# Patient Record
Sex: FEMALE | URBAN - METROPOLITAN AREA
[De-identification: names, ages, dates, MRNs, and addresses within clinical notes are randomized per-mention and may not be internally consistent; named-entity substitution may affect disease eponyms.]

---

## 2017-09-23 ENCOUNTER — EMERGENCY (EMERGENCY)
Facility: HOSPITAL | Age: 47
LOS: 1 days | Discharge: HOME | End: 2017-09-23
Attending: EMERGENCY MEDICINE
Payer: COMMERCIAL

## 2017-09-23 VITALS
DIASTOLIC BLOOD PRESSURE: 85 MMHG | HEART RATE: 68 BPM | SYSTOLIC BLOOD PRESSURE: 148 MMHG | WEIGHT: 160.06 LBS | HEIGHT: 62 IN | RESPIRATION RATE: 16 BRPM | OXYGEN SATURATION: 98 % | TEMPERATURE: 98 F

## 2017-09-23 DIAGNOSIS — R51 HEADACHE: ICD-10-CM

## 2017-09-23 DIAGNOSIS — R20.0 ANESTHESIA OF SKIN: ICD-10-CM

## 2017-09-23 DIAGNOSIS — R53.1 WEAKNESS: ICD-10-CM

## 2017-09-23 DIAGNOSIS — M54.2 CERVICALGIA: ICD-10-CM

## 2017-09-23 LAB
ALBUMIN SERPL ELPH-MCNC: 3.6 G/DL — SIGNIFICANT CHANGE UP (ref 3.5–5)
ALP SERPL-CCNC: 81 U/L — SIGNIFICANT CHANGE UP (ref 40–120)
ALT FLD-CCNC: 27 U/L DA — SIGNIFICANT CHANGE UP (ref 10–60)
ANION GAP SERPL CALC-SCNC: 7 MMOL/L — SIGNIFICANT CHANGE UP (ref 5–17)
APPEARANCE UR: CLEAR — SIGNIFICANT CHANGE UP
AST SERPL-CCNC: 19 U/L — SIGNIFICANT CHANGE UP (ref 10–40)
BACTERIA # UR AUTO: ABNORMAL /HPF
BASOPHILS # BLD AUTO: 0.1 K/UL — SIGNIFICANT CHANGE UP (ref 0–0.2)
BASOPHILS NFR BLD AUTO: 1.4 % — SIGNIFICANT CHANGE UP (ref 0–2)
BILIRUB SERPL-MCNC: 0.3 MG/DL — SIGNIFICANT CHANGE UP (ref 0.2–1.2)
BILIRUB UR-MCNC: NEGATIVE — SIGNIFICANT CHANGE UP
BUN SERPL-MCNC: 17 MG/DL — SIGNIFICANT CHANGE UP (ref 7–18)
CALCIUM SERPL-MCNC: 8.9 MG/DL — SIGNIFICANT CHANGE UP (ref 8.4–10.5)
CHLORIDE SERPL-SCNC: 111 MMOL/L — HIGH (ref 96–108)
CO2 SERPL-SCNC: 25 MMOL/L — SIGNIFICANT CHANGE UP (ref 22–31)
COLOR SPEC: YELLOW — SIGNIFICANT CHANGE UP
CREAT SERPL-MCNC: 0.77 MG/DL — SIGNIFICANT CHANGE UP (ref 0.5–1.3)
DIFF PNL FLD: NEGATIVE — SIGNIFICANT CHANGE UP
EOSINOPHIL # BLD AUTO: 0.4 K/UL — SIGNIFICANT CHANGE UP (ref 0–0.5)
EOSINOPHIL NFR BLD AUTO: 5.3 % — SIGNIFICANT CHANGE UP (ref 0–6)
EPI CELLS # UR: SIGNIFICANT CHANGE UP
GLUCOSE SERPL-MCNC: 99 MG/DL — SIGNIFICANT CHANGE UP (ref 70–99)
GLUCOSE UR QL: NEGATIVE — SIGNIFICANT CHANGE UP
HCT VFR BLD CALC: 32.5 % — LOW (ref 34.5–45)
HGB BLD-MCNC: 10.2 G/DL — LOW (ref 11.5–15.5)
KETONES UR-MCNC: NEGATIVE — SIGNIFICANT CHANGE UP
LEUKOCYTE ESTERASE UR-ACNC: ABNORMAL
LYMPHOCYTES # BLD AUTO: 2.2 K/UL — SIGNIFICANT CHANGE UP (ref 1–3.3)
LYMPHOCYTES # BLD AUTO: 30.6 % — SIGNIFICANT CHANGE UP (ref 13–44)
MCHC RBC-ENTMCNC: 24.8 PG — LOW (ref 27–34)
MCHC RBC-ENTMCNC: 31.3 GM/DL — LOW (ref 32–36)
MCV RBC AUTO: 79.2 FL — LOW (ref 80–100)
MONOCYTES # BLD AUTO: 0.6 K/UL — SIGNIFICANT CHANGE UP (ref 0–0.9)
MONOCYTES NFR BLD AUTO: 8.6 % — SIGNIFICANT CHANGE UP (ref 2–14)
NEUTROPHILS # BLD AUTO: 3.8 K/UL — SIGNIFICANT CHANGE UP (ref 1.8–7.4)
NEUTROPHILS NFR BLD AUTO: 54 % — SIGNIFICANT CHANGE UP (ref 43–77)
NITRITE UR-MCNC: NEGATIVE — SIGNIFICANT CHANGE UP
PH UR: 6 — SIGNIFICANT CHANGE UP (ref 5–8)
PLATELET # BLD AUTO: 325 K/UL — SIGNIFICANT CHANGE UP (ref 150–400)
POTASSIUM SERPL-MCNC: 4.1 MMOL/L — SIGNIFICANT CHANGE UP (ref 3.5–5.3)
POTASSIUM SERPL-SCNC: 4.1 MMOL/L — SIGNIFICANT CHANGE UP (ref 3.5–5.3)
PROT SERPL-MCNC: 7.3 G/DL — SIGNIFICANT CHANGE UP (ref 6–8.3)
PROT UR-MCNC: NEGATIVE — SIGNIFICANT CHANGE UP
RBC # BLD: 4.1 M/UL — SIGNIFICANT CHANGE UP (ref 3.8–5.2)
RBC # FLD: 15.9 % — HIGH (ref 10.3–14.5)
RBC CASTS # UR COMP ASSIST: ABNORMAL /HPF (ref 0–2)
SODIUM SERPL-SCNC: 143 MMOL/L — SIGNIFICANT CHANGE UP (ref 135–145)
SP GR SPEC: 1.01 — SIGNIFICANT CHANGE UP (ref 1.01–1.02)
UROBILINOGEN FLD QL: NEGATIVE — SIGNIFICANT CHANGE UP
WBC # BLD: 7.1 K/UL — SIGNIFICANT CHANGE UP (ref 3.8–10.5)
WBC # FLD AUTO: 7.1 K/UL — SIGNIFICANT CHANGE UP (ref 3.8–10.5)
WBC UR QL: SIGNIFICANT CHANGE UP /HPF (ref 0–5)

## 2017-09-23 PROCEDURE — 70450 CT HEAD/BRAIN W/O DYE: CPT

## 2017-09-23 PROCEDURE — 85027 COMPLETE CBC AUTOMATED: CPT

## 2017-09-23 PROCEDURE — 81001 URINALYSIS AUTO W/SCOPE: CPT

## 2017-09-23 PROCEDURE — 99284 EMERGENCY DEPT VISIT MOD MDM: CPT

## 2017-09-23 PROCEDURE — 80053 COMPREHEN METABOLIC PANEL: CPT

## 2017-09-23 PROCEDURE — 99284 EMERGENCY DEPT VISIT MOD MDM: CPT | Mod: 25

## 2017-09-23 PROCEDURE — 70450 CT HEAD/BRAIN W/O DYE: CPT | Mod: 26

## 2017-09-23 RX ORDER — IBUPROFEN 200 MG
600 TABLET ORAL ONCE
Refills: 0 | Status: COMPLETED | OUTPATIENT
Start: 2017-09-23 | End: 2017-09-23

## 2017-09-23 RX ORDER — DIAZEPAM 5 MG
5 TABLET ORAL ONCE
Refills: 0 | Status: DISCONTINUED | OUTPATIENT
Start: 2017-09-23 | End: 2017-09-23

## 2017-09-23 RX ADMIN — Medication 1 MILLIGRAM(S): at 23:07

## 2017-09-23 RX ADMIN — Medication 600 MILLIGRAM(S): at 23:07

## 2017-09-23 NOTE — ED PROVIDER NOTE - NEUROLOGICAL, MLM
Alert and oriented, no focal deficits, no motor or sensory deficits. Alert and oriented, no focal deficits, no motor or sensory deficits. Normal gait, no droop, cranial nerves 2-12 intact.

## 2017-09-23 NOTE — ED PROVIDER NOTE - CONSTITUTIONAL, MLM
normal... Well appearing, well nourished, awake, alert, oriented to person, place, time/situation and in no apparent distress. Pt is tearful and upset, otwerwise awake, alert, oriented to person, place, time/situation and in no apparent distress. Pt is tearful and upset, otherwise awake, alert, oriented to person, place, time/situation and in no apparent distress.

## 2017-09-23 NOTE — ED PROVIDER NOTE - HISTORY ATTESTATION, MLM
BRIEF OPERATIVE NOTE    Date of Procedure: 5/18/2017   Preoperative Diagnosis: OSTEOARTHRITIS LEFT KNEE  Postoperative Diagnosis: OSTEOARTHRITIS LEFT KNEE    Procedure(s):  LEFT TOTAL KNEE ARTHROPLASTY  Surgeon(s) and Role:     * Stewart Chappell MD - Primary         Assistant Staff:       Surgical Staff:  Circ-1: Alfredo Melendez RN  Circ-Relief: Hema Elzbieta  Scrub Tech-1: Kierra Yip  Scrub Tech-Relief: Zandra Lazo RN-Relief: Leeroy Bottom  Surg Asst-1: Jewell Garcia  Float Staff: Vinny Maldonado  Event Time In   Incision Start 1152   Incision Close      Anesthesia: General   Estimated Blood Loss: <200  Specimens: bone discarded  Findings: oa   Complications: none  Implants:   Implant Name Type Inv.  Item Serial No.  Lot No. LRB No. Used Action   CEMENT BNE SMARTSET HV 40GM -- SMARTSET - SNA  CEMENT BNE SMARTSET HV 40GM -- SMARTSET NA [employer] [id number] Left 2 Implanted   PLATE TIB STEM PC NXGN SZ 3 --  - SNA  PLATE TIB STEM PC NXGN SZ 3 --  NA [employer] [id number] Left 1 Implanted   All Poly Patella, Standard, size 32mm, 8.5 mm thickness Joint Component  NA [employer] [id number] Left 1 Implanted   PLUG STEM TAPR NEXGEN --  - SNA  PLUG STEM TAPR NEXGEN --  NA [employer] [id number] Left 1 Implanted   NexGen Femoral Component, size F, Left Joint Component  NA [employer] [id number] Left 1 Implanted   LPS-Flex, Articular Surface, size E F, 10mm height Joint Component   NA [employer] [id number] Left 1 Implanted
I have reviewed and confirmed nurses' notes...

## 2017-09-23 NOTE — ED ADULT TRIAGE NOTE - CHIEF COMPLAINT QUOTE
Left sided weakness and numbness started 1 hour ago. upon arrival to ER weakness resolved but patient still c/o numbness.

## 2017-09-23 NOTE — ED PROVIDER NOTE - OBJECTIVE STATEMENT
45 y/o F with no significant PMHx presents to ED c/o headache w/ neck pain, numbness down middle of body today. Pt states she gets headache every day, but they get better. Today, she feels like her head, nose and arms are swelling up and getting stiff. Pt notes losing balance occasionally, but she never fell to the floor. Pt states that in June of 2017 she had an ant removed from her ear, and today, her doctor said her ear was scratched up. Pt is tearful because of this. Denies any chest pain, shortness of breath, tinnitus, or any other complaints. NKDA. Pt cleans for a living.

## 2017-09-23 NOTE — ED PROVIDER NOTE - ENMT, MLM
Airway patent, Nasal mucosa clear. Mouth with normal mucosa. Throat has no vesicles, no oropharyngeal exudates and uvula is midline. Ears normal, TMs intact.

## 2017-09-23 NOTE — ED PROVIDER NOTE - MEDICAL DECISION MAKING DETAILS
pt comes in with h/o chronic HA , left trapezius and sternocleido mastoid tenderness   no cspine tenderness , no neurological deficits improved with benzo and motrin   pedal edema , bilaterally , renal function wnl tiarra refre to vascular

## 2017-09-24 VITALS
DIASTOLIC BLOOD PRESSURE: 74 MMHG | HEART RATE: 68 BPM | SYSTOLIC BLOOD PRESSURE: 115 MMHG | OXYGEN SATURATION: 100 % | RESPIRATION RATE: 16 BRPM

## 2017-09-24 RX ORDER — DIAZEPAM 5 MG
1 TABLET ORAL
Qty: 6 | Refills: 0
Start: 2017-09-24 | End: 2017-09-27

## 2017-09-24 RX ORDER — IBUPROFEN 200 MG
1 TABLET ORAL
Qty: 15 | Refills: 0
Start: 2017-09-24 | End: 2017-09-29

## 2019-05-18 ENCOUNTER — EMERGENCY (EMERGENCY)
Facility: HOSPITAL | Age: 49
LOS: 1 days | Discharge: HOME | End: 2019-05-18
Attending: EMERGENCY MEDICINE
Payer: COMMERCIAL

## 2019-05-18 PROCEDURE — 99285 EMERGENCY DEPT VISIT HI MDM: CPT | Mod: 25

## 2019-05-19 VITALS
DIASTOLIC BLOOD PRESSURE: 76 MMHG | TEMPERATURE: 98 F | HEART RATE: 72 BPM | SYSTOLIC BLOOD PRESSURE: 123 MMHG | WEIGHT: 153 LBS | OXYGEN SATURATION: 100 % | RESPIRATION RATE: 16 BRPM

## 2019-05-19 LAB
ALBUMIN SERPL ELPH-MCNC: 3.6 G/DL — SIGNIFICANT CHANGE UP (ref 3.5–5)
ALP SERPL-CCNC: 76 U/L — SIGNIFICANT CHANGE UP (ref 40–120)
ALT FLD-CCNC: 17 U/L DA — SIGNIFICANT CHANGE UP (ref 10–60)
ANION GAP SERPL CALC-SCNC: 6 MMOL/L — SIGNIFICANT CHANGE UP (ref 5–17)
AST SERPL-CCNC: 13 U/L — SIGNIFICANT CHANGE UP (ref 10–40)
BASOPHILS # BLD AUTO: 0.04 K/UL — SIGNIFICANT CHANGE UP (ref 0–0.2)
BASOPHILS NFR BLD AUTO: 0.5 % — SIGNIFICANT CHANGE UP (ref 0–2)
BILIRUB SERPL-MCNC: 0.2 MG/DL — SIGNIFICANT CHANGE UP (ref 0.2–1.2)
BUN SERPL-MCNC: 19 MG/DL — HIGH (ref 7–18)
CALCIUM SERPL-MCNC: 8.4 MG/DL — SIGNIFICANT CHANGE UP (ref 8.4–10.5)
CHLORIDE SERPL-SCNC: 111 MMOL/L — HIGH (ref 96–108)
CO2 SERPL-SCNC: 23 MMOL/L — SIGNIFICANT CHANGE UP (ref 22–31)
CREAT SERPL-MCNC: 0.74 MG/DL — SIGNIFICANT CHANGE UP (ref 0.5–1.3)
EOSINOPHIL # BLD AUTO: 0.39 K/UL — SIGNIFICANT CHANGE UP (ref 0–0.5)
EOSINOPHIL NFR BLD AUTO: 5.3 % — SIGNIFICANT CHANGE UP (ref 0–6)
GLUCOSE SERPL-MCNC: 104 MG/DL — HIGH (ref 70–99)
HCG SERPL-ACNC: <1 MIU/ML — SIGNIFICANT CHANGE UP
HCT VFR BLD CALC: 33.2 % — LOW (ref 34.5–45)
HGB BLD-MCNC: 10.3 G/DL — LOW (ref 11.5–15.5)
IMM GRANULOCYTES NFR BLD AUTO: 0.1 % — SIGNIFICANT CHANGE UP (ref 0–1.5)
LYMPHOCYTES # BLD AUTO: 2.73 K/UL — SIGNIFICANT CHANGE UP (ref 1–3.3)
LYMPHOCYTES # BLD AUTO: 36.8 % — SIGNIFICANT CHANGE UP (ref 13–44)
MCHC RBC-ENTMCNC: 25.4 PG — LOW (ref 27–34)
MCHC RBC-ENTMCNC: 31 GM/DL — LOW (ref 32–36)
MCV RBC AUTO: 81.8 FL — SIGNIFICANT CHANGE UP (ref 80–100)
MONOCYTES # BLD AUTO: 0.53 K/UL — SIGNIFICANT CHANGE UP (ref 0–0.9)
MONOCYTES NFR BLD AUTO: 7.1 % — SIGNIFICANT CHANGE UP (ref 2–14)
NEUTROPHILS # BLD AUTO: 3.72 K/UL — SIGNIFICANT CHANGE UP (ref 1.8–7.4)
NEUTROPHILS NFR BLD AUTO: 50.2 % — SIGNIFICANT CHANGE UP (ref 43–77)
NRBC # BLD: 0 /100 WBCS — SIGNIFICANT CHANGE UP (ref 0–0)
PLATELET # BLD AUTO: 327 K/UL — SIGNIFICANT CHANGE UP (ref 150–400)
POTASSIUM SERPL-MCNC: 3.6 MMOL/L — SIGNIFICANT CHANGE UP (ref 3.5–5.3)
POTASSIUM SERPL-SCNC: 3.6 MMOL/L — SIGNIFICANT CHANGE UP (ref 3.5–5.3)
PROT SERPL-MCNC: 7.3 G/DL — SIGNIFICANT CHANGE UP (ref 6–8.3)
RBC # BLD: 4.06 M/UL — SIGNIFICANT CHANGE UP (ref 3.8–5.2)
RBC # FLD: 15.1 % — HIGH (ref 10.3–14.5)
SODIUM SERPL-SCNC: 140 MMOL/L — SIGNIFICANT CHANGE UP (ref 135–145)
TROPONIN I SERPL-MCNC: <0.015 NG/ML — SIGNIFICANT CHANGE UP (ref 0–0.04)
WBC # BLD: 7.42 K/UL — SIGNIFICANT CHANGE UP (ref 3.8–10.5)
WBC # FLD AUTO: 7.42 K/UL — SIGNIFICANT CHANGE UP (ref 3.8–10.5)

## 2019-05-19 PROCEDURE — 84484 ASSAY OF TROPONIN QUANT: CPT

## 2019-05-19 PROCEDURE — 80053 COMPREHEN METABOLIC PANEL: CPT

## 2019-05-19 PROCEDURE — 99283 EMERGENCY DEPT VISIT LOW MDM: CPT | Mod: 25

## 2019-05-19 PROCEDURE — 85027 COMPLETE CBC AUTOMATED: CPT

## 2019-05-19 PROCEDURE — 36415 COLL VENOUS BLD VENIPUNCTURE: CPT

## 2019-05-19 PROCEDURE — 71100 X-RAY EXAM RIBS UNI 2 VIEWS: CPT | Mod: 26

## 2019-05-19 PROCEDURE — 84702 CHORIONIC GONADOTROPIN TEST: CPT

## 2019-05-19 PROCEDURE — 71100 X-RAY EXAM RIBS UNI 2 VIEWS: CPT

## 2019-05-19 PROCEDURE — 71046 X-RAY EXAM CHEST 2 VIEWS: CPT

## 2019-05-19 PROCEDURE — 93005 ELECTROCARDIOGRAM TRACING: CPT

## 2019-05-19 PROCEDURE — 71046 X-RAY EXAM CHEST 2 VIEWS: CPT | Mod: 26

## 2019-05-19 RX ORDER — METHOCARBAMOL 500 MG/1
1 TABLET, FILM COATED ORAL
Qty: 12 | Refills: 0
Start: 2019-05-19

## 2019-05-19 NOTE — ED PROVIDER NOTE - PHYSICAL EXAMINATION
Afebrile, hemodynamically stable, saturating well on RA  NAD, well appearing, no increased WOB  Head NCAT  EOMI grossly, anicteric  MMM  No JVD  RRR, nml S1/S2, no m/r/g  Lungs CTAB, no w/r/r, no chest ecchymosis or crepitus  Abd soft, NT, ND, nml BS, no rebound or guarding  AAO, CN's 3-12 grossly intact  DELONG spontaneously, no leg cyanosis or edema  Skin warm, well perfused, no rashes or hives

## 2019-05-19 NOTE — ED PROVIDER NOTE - OBJECTIVE STATEMENT
47 y/o F with no significant PMHx/PSHx presents with  to the ED with complaints of one point of intermittent left sided rib pain x 2 weeks. Patient states pain is better when walking and worse with specific movement such as bending. Patient reports breathing makes pain worse. Patient also endorse nausea. Denies cough, vomiting, fever, fall, leg pain, leg swelling, smoking, family history of MI, heavy lifting, pain medication or any other complaints. Patient declines analgesia. 49 y/o F with a significant PMHx of migraine presents with  to the ED with complaints of one point of intermittent left sided rib pain x 2 weeks. Patient states pain is better when walking and worse with specific movement such as bending. Patient reports breathing makes pain worse. Patient also endorse nausea. Denies cough, vomiting, fever, fall, leg pain, leg swelling, smoking, family history of MI, heavy lifting, pain medication or any other complaints. Patient declines analgesia. Declines , uses herself, , and me. 49 y/o F with a significant PMHx of migraine presents with  to the ED with complaints of one point of intermittent left sided rib pain x 2 weeks. Patient states pain is better when walking and worse with specific movement such as bending. Patient reports breathing makes pain worse. Patient also endorses nausea. Denies cough, vomiting, fever, fall, leg pain, leg swelling, smoking, family history of MI, heavy lifting, pain medication or any other complaints. Is a . Patient declines analgesia.

## 2019-05-19 NOTE — ED PROVIDER NOTE - CLINICAL SUMMARY MEDICAL DECISION MAKING FREE TEXT BOX
47 y/o F presents with pain well localized to one spot over ribs and is very muscular in nature. Patient with no risk factors for PE and no dedicated shortness of breath or chest pain to suggest PE. No tachycardia, hypoxia or signs of DVT. Character of low suspicion for ACS; will obtain EKG and troponin to rule this out. Will obtain CXR to rule out PTX; low suspicion for this. Patient is well appearing, is hemodynamically stable with no increased WOB. 49 y/o F presents with pain well localized to one spot over ribs and is very muscular in nature. Patient with no risk factors for PE and no dedicated shortness of breath or chest pain to suggest PE, and no tachycardia, hypoxia or signs of DVT. Character of low suspicion for ACS; will obtain EKG and troponin to rule this out. Will obtain CXR to rule out PTX; low suspicion for this. Patient is well appearing, is hemodynamically stable with no increased WOB. Declines analgesia.  Xrays unremarkable. Patient is well appearing, NAD, afebrile, hemodynamically stable. Discharged with instructions in further symptomatic care, return precautions, and need for PMD f/u.